# Patient Record
Sex: FEMALE
[De-identification: names, ages, dates, MRNs, and addresses within clinical notes are randomized per-mention and may not be internally consistent; named-entity substitution may affect disease eponyms.]

---

## 2023-08-14 ENCOUNTER — HOSPITAL ENCOUNTER (INPATIENT)
Dept: HOSPITAL 46 - FBCO | Age: 18
LOS: 2 days | Discharge: HOME | End: 2023-08-16
Attending: OBSTETRICS & GYNECOLOGY | Admitting: OBSTETRICS & GYNECOLOGY
Payer: COMMERCIAL

## 2023-08-14 VITALS — HEIGHT: 69.02 IN | BODY MASS INDEX: 32 KG/M2 | WEIGHT: 216.05 LBS

## 2023-08-14 VITALS — SYSTOLIC BLOOD PRESSURE: 133 MMHG | DIASTOLIC BLOOD PRESSURE: 86 MMHG

## 2023-08-14 VITALS — SYSTOLIC BLOOD PRESSURE: 102 MMHG | DIASTOLIC BLOOD PRESSURE: 58 MMHG

## 2023-08-14 DIAGNOSIS — Z88.0: ICD-10-CM

## 2023-08-14 DIAGNOSIS — J45.990: ICD-10-CM

## 2023-08-14 DIAGNOSIS — Z67.10: ICD-10-CM

## 2023-08-14 DIAGNOSIS — Z3A.38: ICD-10-CM

## 2023-08-14 DIAGNOSIS — F41.9: ICD-10-CM

## 2023-08-14 DIAGNOSIS — Z91.013: ICD-10-CM

## 2023-08-14 LAB
ABO GROUP BLD: (no result)
COCAINE, UR: NEGATIVE
DEPRECATED RDW RBC AUTO: 13.7 FL (ref 10.5–15)
HCT VFR BLD AUTO: 36 % (ref 35–50)
HGB BLD-MCNC: 11.9 G/DL (ref 12–18)
IAT POLY-SP REAG SERPL QL: NEGATIVE
MCH RBC QN AUTO: 27.9 PG (ref 27–36)
MCHC RBC AUTO-ENTMCNC: 33.1 G/DL (ref 30–36)
MCV RBC AUTO: 84.3 FL (ref 81–99)
METHADONE, UR: NEGATIVE
PLATELET # BLD AUTO: 178 K/UL (ref 140–440)
RBC # BLD AUTO: 4.27 M/UL (ref 4.3–5.7)
RH BLD: POSITIVE
TRANSF BAND NUM PATIENT: (no result)

## 2023-08-14 PROCEDURE — A9270 NON-COVERED ITEM OR SERVICE: HCPCS

## 2023-08-14 NOTE — XMS
Continuity of Care Document
  Created on: 2023
 
 RAMO SANCHEZ
 External Reference #: [id number]
 : [birth year]
 Sex: Female
 
 Demographics
 
 
+-----------------------+----------------------+
| Address               | [address]       |
|                       | [place]  [address] |
+-----------------------+----------------------+
| Preferred Language    | Unknown              |
+-----------------------+----------------------+
| Marital Status        | Never         |
+-----------------------+----------------------+
| Congregation Affiliation | Unknown              |
+-----------------------+----------------------+
| Race                  | Unknown              |
+-----------------------+----------------------+
| Ethnic Group          | Unknown              |
+-----------------------+----------------------+
 
 
 Author
 
 
+--------------+--------------------------+
| Author       | [facility]                 |
+--------------+--------------------------+
| Organization | [facility]                 |
+--------------+--------------------------+
| Address      |  [address] |
|              | [place]  [address]     |
+--------------+--------------------------+
| Phone        | [phone]          |
+--------------+--------------------------+
 
 
 
 Care Team Providers
 
 
+-----------------------+-------------+-------------+
| Care Team Member Name | Role        | Phone       |
+-----------------------+-------------+-------------+
 Unavailable | Unavailable |
+-----------------------+-------------+-------------+
 
 
 
 Allergies
 No information.
 
 Encounters
 
 No information.
 
 Functional Status
 No information.
 
 Immunizations
 No information.
 
 Medications
 No information.
 
 Problems
 
 
+-----------------------+-----------------------------+----------------------+
|  date                 |  description                |  facility            |
+-----------------------+-----------------------------+----------------------+
|  2022 14:36:23  |  Acute gastroenteropathy    |  [facility]  |
|                       | due to [place] agent        | [facility]         |
+-----------------------+-----------------------------+----------------------+
|  2023 23:44     |  UNSPECIFIED FALL, INITIAL  |  [facility]                 |
|                       | ENCOUNTER                   |                      |
+-----------------------+-----------------------------+----------------------+
|  2023 23:44     |  ENCOUNTER FOR EXAM AND     |  [facility]                 |
|                       | OBSERVATION FOLLOWING OTH   |                      |
|                       | ACCIDENT                    |                      |
+-----------------------+-----------------------------+----------------------+
|  2023 23:44     |  28 WEEKS GESTATION OF      |  [facility]                 |
|                       | PREGNANCY                   |                      |
+-----------------------+-----------------------------+----------------------+
|  2023 04:55     |  MATERNAL CARE FOR UNSTABLE |  [facility]                 |
|                       |  LIE, NOT APPL              |                      |
+-----------------------+-----------------------------+----------------------+
|  2023 07:30     |  MATERNAL CARE FOR UNSTABLE |  [facility]                 |
|                       |  LIE, NOT APPL              |                      |
+-----------------------+-----------------------------+----------------------+
 
 
 
 Procedures
 No information.
 
 Results/Labs
 No information.
 
 Social History
 No information.
 
 Vital Signs
 No information.

## 2023-08-14 NOTE — NUR
08/14/23 0856 Sheets,Anastasia
0880 PT ARRIVED TO PACU ON RA, PT MOTHER AND  AT BEDSIDE. PT
DENIES PAIN AND NAUSEA. FUNDAL CHECK DONE WITH [facility] RN. SPINAL LEVEL
T-6 AND PT REPORTS "VERY NUMB."
 
0892 BABY TO CHEST WITH [facility] RN. HOB INCREASED SLIGHTLY AND PT EATING
ICE CHIPS AND DENIES CONCERNS.

## 2023-08-14 NOTE — OR
[facility]
                                    [address], [place]  [address]
_________________________________________________________________________________________
                                                                 Draft    
 
 
DATE OF OPERATION:
2023
 
SURGEON:
Sarah Crisostomo MD
 
FIRST ASSISTANT:
Ian.
 
PREOPERATIVE DIAGNOSES:
Term pregnancy, breech presentation, failure to tolerate a breech version.
 
POSTOPERATIVE DIAGNOSES:
Term pregnancy, breech presentation, failure to tolerate a breech version, delivered.
 
PROCEDURE:
Primary  section with low-segment transverse uterine incision.
 
ANESTHESIA:
Spinal.
 
ESTIMATED BLOOD LOSS:
500 mL.
 
DRAINS:
Bearden catheter.
 
INDICATIONS AND FINDINGS:
The patient is a 18-year-old female,  1, para 0, who was admitted at 38 and 5/7th
weeks for breech version.  The breech version was done, but the baby did not tolerate
the version and because of this with a fetal bradycardia, the decision was made to
proceed with  section.  She was delivered a little girl as a corey breech, right
sacrum anterior prepped via lower segment transverse uterine incision with Apgars of 9
and 9, weight of 7 pounds 1 ounce.  The uterus, tubes, ovaries, and placenta were
normal. 
 
DESCRIPTION OF PROCEDURE:
The patient was prepped and draped in the supine position.  A Pfannenstiel skin incision
was made and carried down through the fascia.  The incision was extended laterally.  The
inferior and superior fascial flaps were then created.  Muscles were bluntly divided.
The peritoneum elevated and incised, and the incision extended bluntly.  The Tyshawn
retractor was placed and the uterine incision was made at the upper aspect of the
 
                                                                                    
_________________________________________________________________________________________
PATIENT NAME:     RAMO SANCHEZ                  
MEDICAL RECORD #: [id number]            OPERATIVE REPORT              
          ACCT #: [id number]  
DATE OF BIRTH:   [birth year]            REPORT #: [id number]      
PHYSICIAN:        SARAH CRISOSTOMO MD            
PCP:              JOSELITO CERON MD            
REPORT IS CONFIDENTIAL AND NOT TO BE RELEASED WITHOUT AUTHORIZATION
 
 
                                  [facility]
                                    [address] [place] [address]  [id number]
_________________________________________________________________________________________
                                                                 Draft    
 
 
peritoneal reflection.  The baby was delivered with the above findings and handed off to
the pediatric staff in attendance.  The placenta was removed manually and the uterus
explored with a lap tape assuring no remaining fragments.  The edges of the incision
were identified and closed with a running locking stitch of 0 Monocryl.  The second
layer was a vertical imbricating stitch of the same suture.  The abdomen was irrigated,
inspected and bleeding points were controlled with cautery.  The Tyshawn retractor was
removed and the peritoneum identified.  The peritoneum was closed with a running suture
of 3-0 Vicryl.  Bleeding points on the superior fascial flap were controlled with
figure-of-eight sutures of 0 Vicryl as these were mostly from perforated vessels.  The
muscles were rendered hemostatic with cautery.  The muscles were brought together with
interrupted sutures of 0 Vicryl.  This layer was irrigated, inspected and good
hemostasis was noted.  The fascia was then closed from each angle to midline with a
running suture of 0 Vicryl.  The subcutaneous was irrigated and bleeding points
controlled with cautery.  The deep space was closed with interrupted sutures of 3-0
Vicryl.  The skin was closed with staples.  All sponge and needle counts were correct.
She tolerated the procedure well and was taken to the recovery room in good condition. 
 
 
 
            ________________________________________
            Sarah Crisostomo MD 
 
 
PJW/MODL
Job #:  [id number]
DD:  2023 12:03:42
DT:  2023 16:39:07
 
 
Copies:                                
~
 
 
 
 
 
 
 
 
 
 
 
 
                                                                                    
_________________________________________________________________________________________
PATIENT NAME:     RAMO SANCHEZ                  
MEDICAL RECORD #: [id number]            OPERATIVE REPORT              
          ACCT #: [id number]  
DATE OF BIRTH:   [birth year]            REPORT #: [id number]      
PHYSICIAN:        SARHA CRISOSTOMO MD            
PCP:              JOSELITO CERON MD            
REPORT IS CONFIDENTIAL AND NOT TO BE RELEASED WITHOUT AUTHORIZATION

## 2023-08-15 LAB
DEPRECATED RDW RBC AUTO: 14 FL (ref 10.5–15)
HCT VFR BLD AUTO: 31.8 % (ref 35–50)
HGB BLD-MCNC: 10.3 G/DL (ref 12–18)
MCH RBC QN AUTO: 27.9 PG (ref 27–36)
MCHC RBC AUTO-ENTMCNC: 32.4 G/DL (ref 30–36)
MCV RBC AUTO: 86.1 FL (ref 81–99)
PLATELET # BLD AUTO: 186 K/UL (ref 140–440)
RBC # BLD AUTO: 3.7 M/UL (ref 4.3–5.7)

## 2023-08-15 NOTE — NUR
MOM IN BED.  FAMILY IN ROOM.  GRANDMOTHER HOLDING BABY.  BABY APPEARED TO BE
RESTING CONTENTEDLY IN GRANDMOTHER'S ARMS.  CONSENTED TO PRAYER.  PRAYED FOR
GOOD BEGINNINGS AND ONGOING BLESSING.

## 2023-08-15 NOTE — PR
[facility]
                                    [address]
                                  [place], [place]  [address]
_________________________________________________________________________________________
                                                                 Signed   
 
 
===================================
PP Progress Notes
===================================
Datetime Report Generated by CPN: 08/15/2023 08:10
   
SUBJECTIVE:  [id number]
Pain:  Within Normal Limits
Nausea/Vomiting:  Denies
Flatus:  Yes
Bowel Movement:  No
Vital Signs:  [id number]
Vital Signs:  Reviewed; Within Normal Limits
Cardiovascular:  Normal
Respiratory:  Normal
Abdomen/Uterus:  Abnormal
Lochia:  Normal
Vulva/Perineum:  Not Done
Breasts:  Not Done
CVA Tenderness:  Not Done
Extremities:  Normal
 Incision:  Normal
Breastfeeding Progress:  Abnormal
Exam Comments:  Abdomen with active BS.  Fundus firm, NT @ U-1.
      
   H/H 10.3/31.8, WBC 13.1, plat 186k
IMPRESSION/PLAN/PROCEDURES:  [id number]
Impression:  Normal Postpartum Progression
Other Plans:  ambulate, shower
Procedures:  None
Progress Notes:  Doing well.  Will increase ambulation.
Signing Physician:  Sarah Crisostomo MD
 
 
Copies:                                
~
 
 
 
 
 
 
 
 
*Electronically Signed*  08/15/23  0810  SARAH CRISOSTOMO MD            
                                                                       
_________________________________________________________________________________________
PATIENT NAME:     RAMO SANCHEZ                 
MEDICAL RECORD #: [id number]                     PROGRESS NOTE                 
          ACCT #: [id number]  
DATE OF BIRTH:   [birth year]                                       
PHYSICIAN:   SARAH CRISOSTOMO MD                   RPT #: [id number]
REPORT IS CONFIDENTIAL AND NOT TO BE RELEASED WITHOUT AUTHORIZATION

## 2023-08-16 NOTE — NUR
PT IN CHAIR WITH BABY AT BREAST.  DAD ASSISTING WITH GETTING BABY TO EAT.
STATE BABY TENDING TO SNACK AND SLEEP DURING THE DAY AND WANT TO EAT ALL
NIGHT.  EXPRESSED CONFIDENCE AND COMFORT THAT THEY WOULD GET IT WORKED OUT.
CONSENTED TO PRAYER.  PRAYED FOR POSITIVE OUTCOMES.

## 2023-08-16 NOTE — PR
[facility]
                                    [address]
                                  [place], [place]  [address]
_________________________________________________________________________________________
                                                                 Signed   
 
 
===================================
PP Progress Notes
===================================
Datetime Report Generated by CPN: 2023 11:55
   
SUBJECTIVE:  [id number]
Pain:  Within Normal Limits
Nausea/Vomiting:  Denies
Flatus:  Yes
Bowel Movement:  Yes
Vital Signs:  [id number]
Vital Signs:  Reviewed; Within Normal Limits
POSTPARTUM EXAM:  Met
Cardiovascular:  Not Done
Respiratory:  Not Done
Abdomen/Uterus:  Abnormal
Lochia:  Normal
Vulva/Perineum:  Not Done
Breasts:  Not Done
CVA Tenderness:  Not Done
Extremities:  Normal
 Incision:  Normal
Breastfeeding Progress:  Normal
Exam Comments:  Abdomen with active BS.  Fundus firm, NT @ U-2.
IMPRESSION/PLAN/PROCEDURES:  [id number]
Impression:  Normal Postpartum Progression
Plan:  Remove Staples; Discharge
Other Plans:  ambulate, shower
Procedures:  None
Progress Notes:  Doing well.  She is ready for D/C.
Signing Physician:  Sarah Crisostomo MD
 
 
Copies:                                
~
 
 
 
 
 
 
 
 
*Electronically Signed*  23  1155  SARAH CRISOSTOMO MD            
                                                                       
_________________________________________________________________________________________
PATIENT NAME:     RAMO SANCHEZ                 
MEDICAL RECORD #: [id number]                     PROGRESS NOTE                 
          ACCT #: [id number]  
DATE OF BIRTH:   [birth year]                                       
PHYSICIAN:   SARAH CRISOSTOMO MD                   RPT #: [id number]
REPORT IS CONFIDENTIAL AND NOT TO BE RELEASED WITHOUT AUTHORIZATION